# Patient Record
Sex: MALE | Race: WHITE | NOT HISPANIC OR LATINO
[De-identification: names, ages, dates, MRNs, and addresses within clinical notes are randomized per-mention and may not be internally consistent; named-entity substitution may affect disease eponyms.]

---

## 2020-02-10 PROBLEM — Z00.00 ENCOUNTER FOR PREVENTIVE HEALTH EXAMINATION: Status: ACTIVE | Noted: 2020-02-10

## 2020-02-20 ENCOUNTER — APPOINTMENT (OUTPATIENT)
Dept: HEMATOLOGY ONCOLOGY | Facility: CLINIC | Age: 60
End: 2020-02-20
Payer: COMMERCIAL

## 2020-02-20 VITALS
SYSTOLIC BLOOD PRESSURE: 118 MMHG | OXYGEN SATURATION: 97 % | BODY MASS INDEX: 33.49 KG/M2 | DIASTOLIC BLOOD PRESSURE: 80 MMHG | HEIGHT: 68 IN | HEART RATE: 56 BPM | WEIGHT: 221 LBS

## 2020-02-20 DIAGNOSIS — Z78.9 OTHER SPECIFIED HEALTH STATUS: ICD-10-CM

## 2020-02-20 DIAGNOSIS — Z80.1 FAMILY HISTORY OF MALIGNANT NEOPLASM OF TRACHEA, BRONCHUS AND LUNG: ICD-10-CM

## 2020-02-20 DIAGNOSIS — C91.10 CHRONIC LYMPHOCYTIC LEUKEMIA OF B-CELL TYPE NOT HAVING ACHIEVED REMISSION: ICD-10-CM

## 2020-02-20 DIAGNOSIS — Z86.19 PERSONAL HISTORY OF OTHER INFECTIOUS AND PARASITIC DISEASES: ICD-10-CM

## 2020-02-20 DIAGNOSIS — Z80.41 FAMILY HISTORY OF MALIGNANT NEOPLASM OF OVARY: ICD-10-CM

## 2020-02-20 PROCEDURE — 85025 COMPLETE CBC W/AUTO DIFF WBC: CPT

## 2020-02-20 PROCEDURE — 99205 OFFICE O/P NEW HI 60 MIN: CPT

## 2020-02-21 LAB
ALBUMIN SERPL ELPH-MCNC: 4.8 G/DL
ALP BLD-CCNC: 64 U/L
ALT SERPL-CCNC: 15 U/L
ANION GAP SERPL CALC-SCNC: 12 MMOL/L
AST SERPL-CCNC: 12 U/L
BILIRUB SERPL-MCNC: 0.6 MG/DL
BUN SERPL-MCNC: 21 MG/DL
CALCIUM SERPL-MCNC: 10 MG/DL
CHLORIDE SERPL-SCNC: 104 MMOL/L
CO2 SERPL-SCNC: 27 MMOL/L
CREAT SERPL-MCNC: 1.25 MG/DL
GLUCOSE SERPL-MCNC: 91 MG/DL
LDH SERPL-CCNC: 161 U/L
MAGNESIUM SERPL-MCNC: 2.4 MG/DL
PHOSPHATE SERPL-MCNC: 3.5 MG/DL
POTASSIUM SERPL-SCNC: 5 MMOL/L
PROT SERPL-MCNC: 7 G/DL
SODIUM SERPL-SCNC: 142 MMOL/L
URATE SERPL-MCNC: 7 MG/DL

## 2020-02-21 NOTE — RESULTS/DATA
[FreeTextEntry1] : CBC done 2/20/20\par \par wbc: 21.7\par hgb: 15.8\par hct: 46.1\par alc: 15.43\par anc: 4.80

## 2020-02-21 NOTE — HISTORY OF PRESENT ILLNESS
[de-identified] : Mr. 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 Pt was scheduled to have cervical spine surgery at [facility] in June 2019, when a CT of the neck showed he had b/l enlarged suspicious lymph nodes. His blood work in 6/2019 showed WBC of 27. Pt followed up with Dr. Sullivan 1/9/20, who orderd FISH/Flow studies which revealed monoclonal lambda B-cell population co-expressing CD 5 and CD23, dim CD 20 epxression, CD38 negative. Negative for Zap 70. +del 13q. \par \par Pt denies recent weight loss, fevers, night sweats, bulky LAD or extreme fatigue. Appetite is good. Cervical surgery is on hold, pt's sx have improved since last year. [FreeTextEntry1] : treatment naive [de-identified] : Flow cytometry done 1/9/20:\par A monoclonal lambda B-cell population: CD5(+), CD23(+), CD38 (-)\par Zap 70 neg\par +Del 13 q\par

## 2020-02-21 NOTE — ASSESSMENT
[FreeTextEntry1] : Mr. Castillo is a 58 yo male with recently diagnosed CLL (6/2019) who presents today for a second opinion. Pt was scheduled to have cervical spine surgery at [facility] in June 2019, when a CT of the neck showed he had b/l enlarged suspicious lymph nodes. His blood work in 6/2019 showed WBC of 27. Pt followed up with Dr. Sullivan 1/9/20, who orderd FISH/Flow studies which revealed monoclonal lambda B-cell population co-expressing CD 5 and CD23, dim CD 20 epxression, CD38 negative. Negative for Zap 70. +del 13q. \par  \par \par \par We had a long discussion with the patient and his wife  about the clinical nature of CLL. We have discussed that we would not recommend any therapy at this time given that she is asymptomatic with no anemia or concerning thrombocytopenia. We explained that while CLL is not curable, it is a chronic condition and therefore we do not treat it unless it is clinically indicated. We went over the indications for therapy including bulky LAD, splenomegaly, rapidly increasing WBC, anemia, thrombocytopenia or any constitutional complaints.  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 We have advised the patient to continue with routine maintenance and age appropriate screening including mammogram, colonoscopy, dermatology evaluation, and flu vaccine. We have explained that she should avoid all live vaccines. We have discussed the increased risk of infection associated with CLL and that the patient should be aware to alert any physicians or treating providers of this diagnosis.\par

## 2022-05-24 ENCOUNTER — APPOINTMENT (OUTPATIENT)
Dept: ORTHOPEDIC SURGERY | Facility: CLINIC | Age: 62
End: 2022-05-24
Payer: COMMERCIAL

## 2022-05-24 VITALS — HEIGHT: 68 IN | WEIGHT: 215 LBS | BODY MASS INDEX: 32.58 KG/M2

## 2022-05-24 PROCEDURE — 99213 OFFICE O/P EST LOW 20 MIN: CPT

## 2022-05-24 NOTE — ASSESSMENT
[FreeTextEntry1] :  Discussed importance of non-impact exercise and muscle stretching before and after exercise.  Continue HEP. Explained the importance of ice and rest.\par \par f/u PRN

## 2022-05-24 NOTE — REASON FOR VISIT
[FreeTextEntry2] : here today for f/u left shoulder arthroscopy RCR, SAD, debridement 5/20/21.\par feels good, no complaints

## 2022-05-24 NOTE — PHYSICAL EXAM
[Left] : left shoulder [5 ___] : forward flexion 5[unfilled]/5 [5___] : internal rotation 5[unfilled]/5 [] : no scapular winging [FreeTextEntry8] : Very Mild  [FreeTextEntry9] : IR T10 [TWNoteComboBox7] : active forward flexion 170 degrees [de-identified] : active abduction 90 degrees [TWNoteComboBox6] : internal rotation L1 [de-identified] : external rotation 60 degrees

## 2023-03-20 ENCOUNTER — APPOINTMENT (OUTPATIENT)
Dept: ORTHOPEDIC SURGERY | Facility: CLINIC | Age: 63
End: 2023-03-20
Payer: COMMERCIAL

## 2023-03-20 ENCOUNTER — FORM ENCOUNTER (OUTPATIENT)
Age: 63
End: 2023-03-20

## 2023-03-20 VITALS — WEIGHT: 215 LBS | HEIGHT: 68 IN | BODY MASS INDEX: 32.58 KG/M2

## 2023-03-20 DIAGNOSIS — M17.12 UNILATERAL PRIMARY OSTEOARTHRITIS, LEFT KNEE: ICD-10-CM

## 2023-03-20 PROCEDURE — 73562 X-RAY EXAM OF KNEE 3: CPT | Mod: LT

## 2023-03-20 PROCEDURE — 99213 OFFICE O/P EST LOW 20 MIN: CPT

## 2023-03-20 PROCEDURE — L1833: CPT | Mod: LT

## 2023-03-20 RX ORDER — HYDROCODONE BITARTRATE AND ACETAMINOPHEN 5; 325 MG/1; MG/1
5-325 TABLET ORAL
Qty: 30 | Refills: 0 | Status: ACTIVE | COMMUNITY
Start: 2023-03-20 | End: 1900-01-01

## 2023-03-20 NOTE — HISTORY OF PRESENT ILLNESS
[de-identified] : Patient is here today for the left knee.  patient was playing pickelball and had sudden stop on court and noted pop of the knee. He has noted medial knee pain and needs crutches to ambulate. He notes lack of ROM of the knee and swelling. He had prior medial knee pain which was manageable with NSAIDS

## 2023-03-20 NOTE — PHYSICAL EXAM
[Left] : left knee [de-identified] : Constitutional: The patient appears well developed, well nourished. Examination of patients ability to communicate functionally was normal.  \par \par  \par \par Neurologic: Coordination is normal. Alert and oriented to time, place and person. No evidence of mood disorder, calm affect.  \par \par  \par \par    LEFT  KNEE: Inspection of the knee is as follows: MODERATE effusion. no ecchymosis, no streaking, no erythema, no atrophy, no deformities of the quad tendon and no deformities of patellar tendon.  \par \par  \par \par Palpation of the knee is as follows: medial joint line tenderness. MCL TTP femoral insertion  no obvious defects, no palpable masses, no increased warmth and no crepitus.  \par \par  \par \par Knee Range of Motion is as follows in degrees: \par \par   \par \par Extension: 2  \par \par Flexion: 90 \par \par  \par \par Strength examination of the knee is as follows:  \par \par  \par \par \par \par  \par \par Ligament Stability and Special Test:  positive McMurrays test. ligamentously stable,UNABLE TO TEST DRAWER DUE TO GUARDING/PAIN, negative posterior draw and no varus or valgus instability. patella tracks well and able to do active straight leg raise without an extensor lag.  \par THERE IS PAIN ELICITED MEDIAL WITH VALGUS STRESS. NO SIGNIFICANT LAXITY\par \par  \par \par Neurological examination of the knee is as follows: light touch is intact throughout.  \par \par  \par \par Gait and function is as follows: non-antalgic gait.  [FreeTextEntry9] : ap/lat/skiers show minimal anterior tibial plateau spurring.

## 2023-03-20 NOTE — DISCUSSION/SUMMARY
[de-identified] : Plan is for MRI Left knee and pain medication. He will WBAT in the KI.  He will Ice/elevate PRN. Eval for frx/ACL tear.

## 2023-03-21 ENCOUNTER — APPOINTMENT (OUTPATIENT)
Dept: MRI IMAGING | Facility: CLINIC | Age: 63
End: 2023-03-21
Payer: COMMERCIAL

## 2023-03-21 PROCEDURE — 73721 MRI JNT OF LWR EXTRE W/O DYE: CPT | Mod: LT

## 2023-03-27 ENCOUNTER — APPOINTMENT (OUTPATIENT)
Dept: ORTHOPEDIC SURGERY | Facility: CLINIC | Age: 63
End: 2023-03-27
Payer: COMMERCIAL

## 2023-03-27 DIAGNOSIS — S83.412A SPRAIN OF MEDIAL COLLATERAL LIGAMENT OF LEFT KNEE, INITIAL ENCOUNTER: ICD-10-CM

## 2023-03-27 PROCEDURE — 99215 OFFICE O/P EST HI 40 MIN: CPT

## 2023-03-29 NOTE — HISTORY OF PRESENT ILLNESS
[de-identified] : following up for the left knee. Patient is here today for the left knee.  Patient states the knee has been feeling better since the last visit.  He states the brace has been very helpful. Injury to the knee was 1 week ago. his pain is medial aspect of the knee.

## 2023-03-29 NOTE — PHYSICAL EXAM
[de-identified] : Constitutional: The patient appears well developed, well nourished. Examination of patients ability to communicate functionally was normal.  \par \par  \par \par Neurologic: Coordination is normal. Alert and oriented to time, place and person. No evidence of mood disorder, calm affect.  \par \par  \par \par    LEFT  KNEE: Inspection of the knee is as follows: MODERATE effusion. no ecchymosis, no streaking, no erythema, no atrophy, no deformities of the quad tendon and no deformities of patellar tendon.  \par \par  \par \par Palpation of the knee is as follows: medial joint line tenderness. MCL TTP femoral insertion  no obvious defects, no palpable masses, no increased warmth and no crepitus.  \par \par  \par \par Knee Range of Motion is as follows in degrees: \par \par   \par \par Extension: 0  \par \par Flexion: 90 \par \par  \par \par Strength examination of the knee is as follows:  \par \par  \par \par \par \par  \par \par Ligament Stability and Special Test:  positive McMurrays test. ligamentously stable, negative posterior draw and no varus or valgus instability. patella tracks well and able to do active straight leg raise without an extensor lag.  \par THERE IS PAIN ELICITED MEDIAL WITH VALGUS STRESS. NO SIGNIFICANT LAXITY\par \par  \par \par Neurological examination of the knee is as follows: light touch is intact throughout.  \par \par  \par \par Gait and function is as follows: non-antalgic gait.

## 2023-03-29 NOTE — DISCUSSION/SUMMARY
[de-identified] : Options were discussed today including oral anti-inflammatories, Physical Therapy, Steroid Injection, Hyalgan injections or Surgery. The patient had time to ask questions of the different treatment options, including doing nothing and just observing for a finite period of time and re-evaluating in the future. Patient will WBAT in the brace. Plan is for knee scope. We will not address the MCL , just the meniscus

## 2023-03-29 NOTE — REASON FOR VISIT
[FreeTextEntry2] : MRI impressions L knee: 1. Medial meniscal tear.\par 2. Arthrosis with joint effusion.\par 3. MCL low-to-moderate grade tear at the femur.\par 4. Hamstring and gastrocnemius tendinopathy with insertional tears, soft tissue edema and bursitis.

## 2023-04-02 ENCOUNTER — FORM ENCOUNTER (OUTPATIENT)
Age: 63
End: 2023-04-02

## 2023-05-05 ENCOUNTER — APPOINTMENT (OUTPATIENT)
Dept: ORTHOPEDIC SURGERY | Facility: AMBULATORY SURGERY CENTER | Age: 63
End: 2023-05-05

## 2023-09-05 ENCOUNTER — APPOINTMENT (OUTPATIENT)
Dept: ORTHOPEDIC SURGERY | Facility: CLINIC | Age: 63
End: 2023-09-05
Payer: COMMERCIAL

## 2023-09-05 VITALS — BODY MASS INDEX: 32.58 KG/M2 | WEIGHT: 215 LBS | HEIGHT: 68 IN

## 2023-09-05 PROCEDURE — 99214 OFFICE O/P EST MOD 30 MIN: CPT

## 2023-09-05 RX ORDER — MELOXICAM 15 MG/1
15 TABLET ORAL
Qty: 30 | Refills: 0 | Status: ACTIVE | COMMUNITY
Start: 2023-09-05 | End: 1900-01-01

## 2023-09-05 NOTE — REASON FOR VISIT
[FreeTextEntry2] : PATIENT HERE FOR EVAL OF LEFT KNEE PAIN.  STARTED IN 3/2023 SAW CHIDI, WAS SCHEDULED FOR SURGERY BUT KNEE PAIN GOT BETTER SO HE CANCELLED SURGERY.  LAST 6 WEEKS KNEE PAIN IS BACK,  PAIN IS 4/10, LOCKING/CATCHING EPISODES, DIFFICULTY WITH EXTENSION.  USING ALEVE FOR PAIN.

## 2023-09-05 NOTE — DATA REVIEWED
[MRI] : MRI [Left] : left [Knee] : knee [Report was reviewed and noted in the chart] : The report was reviewed and noted in the chart [I reviewed the films/CD and agree] : I reviewed the films/CD and agree [FreeTextEntry1] : Medial meniscus tear Arthrosis with joint effusion MCL low grade tear Hamstring and Gastroc tendonitis

## 2023-09-05 NOTE — DISCUSSION/SUMMARY
[de-identified] : I spent time going in detail the problem and the associated risks/benefits of left KA surgery. detailed complications but not limited to, nerve injury, non-union, repeat fracture, DVT /PE /pe postop, instability, transfusion, infection, NVA injury, stiffness, leg length discrepancy, inability to ambulate & death. discussed implant and model shown to patient. answered all patient questions. patient understands procedure and postop directions. Patient has failed conservative treatment and PT is contraindicated at this time.  Rx given for Mobic today, use as needed for pain.  Continue HEP and stretching   f/u PRN

## 2023-09-05 NOTE — PHYSICAL EXAM
[de-identified] : Constitutional: The patient appears well developed, well nourished. Examination of patients ability to communicate functionally was normal.  \par  \par   \par  \par  Neurologic: Coordination is normal. Alert and oriented to time, place and person. No evidence of mood disorder, calm affect.  \par  \par   \par  \par     LEFT  KNEE: Inspection of the knee is as follows: MODERATE effusion. no ecchymosis, no streaking, no erythema, no atrophy, no deformities of the quad tendon and no deformities of patellar tendon.  \par  \par   \par  \par  Palpation of the knee is as follows: medial joint line tenderness. MCL TTP femoral insertion  no obvious defects, no palpable masses, no increased warmth and no crepitus.  \par  \par   \par  \par  Knee Range of Motion is as follows in degrees: \par  \par    \par  \par  Extension: 2  \par  \par  Flexion: 90 \par  \par   \par  \par  Strength examination of the knee is as follows:  \par  \par   \par  \par  \par  \par   \par  \par  Ligament Stability and Special Test:  positive McMurrays test. ligamentously stable,UNABLE TO TEST DRAWER DUE TO GUARDING/PAIN, negative posterior draw and no varus or valgus instability. patella tracks well and able to do active straight leg raise without an extensor lag.  \par  THERE IS PAIN ELICITED MEDIAL WITH VALGUS STRESS. NO SIGNIFICANT LAXITY\par  \par   \par  \par  Neurological examination of the knee is as follows: light touch is intact throughout.  \par  \par   \par  \par  Gait and function is as follows: non-antalgic gait.  [] : medial joint line tenderness [5___] : hamstring 5[unfilled]/5 [Positive] : positive Willian [Left] : left knee [All Views] : anteroposterior, lateral, skyline, and anteroposterior standing [de-identified] : PAIN VALGUS STABLE  [FreeTextEntry9] : ap/lat/skiers show minimal anterior tibial plateau spurring. [TWNoteComboBox7] : flexion 120 degrees [de-identified] : extension 0 degrees

## 2023-10-19 ENCOUNTER — APPOINTMENT (OUTPATIENT)
Dept: ORTHOPEDIC SURGERY | Facility: AMBULATORY SURGERY CENTER | Age: 63
End: 2023-10-19
Payer: COMMERCIAL

## 2023-10-19 PROCEDURE — 20610 DRAIN/INJ JOINT/BURSA W/O US: CPT | Mod: 59,RT

## 2023-10-19 PROCEDURE — 29881 ARTHRS KNE SRG MNISECTMY M/L: CPT | Mod: AS,RT

## 2023-10-19 PROCEDURE — 29881 ARTHRS KNE SRG MNISECTMY M/L: CPT | Mod: RT

## 2023-10-19 RX ORDER — HYDROCODONE BITARTRATE AND ACETAMINOPHEN 5; 325 MG/1; MG/1
5-325 TABLET ORAL
Qty: 10 | Refills: 0 | Status: ACTIVE | COMMUNITY
Start: 2023-10-19 | End: 1900-01-01

## 2023-10-27 ENCOUNTER — APPOINTMENT (OUTPATIENT)
Dept: ORTHOPEDIC SURGERY | Facility: CLINIC | Age: 63
End: 2023-10-27
Payer: COMMERCIAL

## 2023-10-27 PROCEDURE — 99024 POSTOP FOLLOW-UP VISIT: CPT

## 2023-11-21 ENCOUNTER — APPOINTMENT (OUTPATIENT)
Dept: ORTHOPEDIC SURGERY | Facility: CLINIC | Age: 63
End: 2023-11-21
Payer: COMMERCIAL

## 2023-11-21 DIAGNOSIS — Z78.9 OTHER SPECIFIED HEALTH STATUS: ICD-10-CM

## 2023-11-21 DIAGNOSIS — S83.242A OTHER TEAR OF MEDIAL MENISCUS, CURRENT INJURY, LEFT KNEE, INITIAL ENCOUNTER: ICD-10-CM

## 2023-11-21 PROCEDURE — 99213 OFFICE O/P EST LOW 20 MIN: CPT | Mod: 24

## 2024-03-12 ENCOUNTER — APPOINTMENT (OUTPATIENT)
Dept: ORTHOPEDIC SURGERY | Facility: CLINIC | Age: 64
End: 2024-03-12
Payer: COMMERCIAL

## 2024-03-12 VITALS — WEIGHT: 215 LBS | HEIGHT: 68 IN | BODY MASS INDEX: 32.58 KG/M2

## 2024-03-12 PROCEDURE — 20610 DRAIN/INJ JOINT/BURSA W/O US: CPT | Mod: LT

## 2024-03-12 PROCEDURE — 73564 X-RAY EXAM KNEE 4 OR MORE: CPT | Mod: LT

## 2024-03-12 PROCEDURE — 99214 OFFICE O/P EST MOD 30 MIN: CPT | Mod: 25

## 2024-03-12 NOTE — PHYSICAL EXAM
[Left] : left knee [NL (0)] : extension 0 degrees [5___] : hamstring 5[unfilled]/5 [Positive] : positive Willian [] : patient ambulates without assistive device [FreeTextEntry3] : grade 1 trace effusion  [de-identified] : no pain with valgus stress  [TWNoteComboBox7] : flexion 125 degrees

## 2024-03-12 NOTE — DISCUSSION/SUMMARY
[de-identified] : Lengthy discussion regarding options was had with the patient. Nonsurgical options including but not limited to cortisone, Visco supplementation, anti-inflammatory medications (both steroidal and non-steroidal), activity modification, non-impact exercise, maintaining a healthy BMI, bracing, and icing were reviewed. Surgical options including but not limited to arthroscopy, and joint replacement were discussed as was risks, benefits and alternatives.  todays plan is to procced with left knee gelsyn injection, discussed briefly about MUKA as well.   f/u 1 week 
no

## 2024-03-12 NOTE — REASON FOR VISIT
[FreeTextEntry2] : H ere for f/u left knee pain.  s/p left knee scope 10/19/23.  states pain started in 2/2024 after playing pickleball everyday.  he is very active. c/o  medial sided pain and swelling, buckling, going downstairs one at a time now.

## 2024-03-12 NOTE — PROCEDURE
[Large Joint Injection] : Large joint injection [de-identified] : Large joint injection was performed of the left knee . Injection # 1    The indication for this procedure was pain and inflammation. The site was prepped with betadine and sterile technique used. Patient tolerated procedure well. Patient was advised to call if redness, pain or fever occur, apply ice for 15 minutes out of every hour for the next 12-24 hours as tolerated and patient was advised to rest the joint(s) for 1 days.    Injection:  Glesyn Series 16.8mg/ml injection     Patient has tried OTC's including aspirin, Ibuprofen, Aleve, etc or prescription NSAIDS, and/or exercises at home and/or physical therapy without satisfactory response, patient had decreased mobility in the joint and the risks benefits, and alternatives have been discussed, and verbal consent was obtained.

## 2024-03-22 ENCOUNTER — APPOINTMENT (OUTPATIENT)
Dept: ORTHOPEDIC SURGERY | Facility: CLINIC | Age: 64
End: 2024-03-22
Payer: COMMERCIAL

## 2024-03-22 PROCEDURE — 20610 DRAIN/INJ JOINT/BURSA W/O US: CPT | Mod: LT

## 2024-03-22 NOTE — PHYSICAL EXAM
[Left] : left knee [NL (0)] : extension 0 degrees [5___] : quadriceps 5[unfilled]/5 [Positive] : positive Willian [] : patient ambulates without assistive device [FreeTextEntry3] : grade 1 trace effusion  [de-identified] : no pain with valgus stress  [TWNoteComboBox7] : flexion 125 degrees

## 2024-03-22 NOTE — PROCEDURE
[Left] : of the left [Large Joint Injection] : Large joint injection [Knee] : knee [Pain] : pain [Inflammation] : inflammation [Betadine] : betadine [Ethyl Chloride sprayed topically] : ethyl chloride sprayed topically [Sterile technique used] : sterile technique used [___ cc    1%] : Lidocaine ~Vcc of 1%  [___ cc    6mg] :  Betamethasone (Celestone) ~Vcc of 6mg [Gel-Syn (16.8mg)] : 16.8mg of Gel-Syn [#2] : series #2 [] : Patient tolerated procedure well [Call if redness, pain or fever occur] : call if redness, pain or fever occur [Apply ice for 15min out of every hour for the next 12-24 hours as tolerated] : apply ice for 15 minutes out of every hour for the next 12-24 hours as tolerated [Previous OTC use and PT nontherapeutic] : patient has tried OTC's including aspirin, Ibuprofen, Aleve, etc or prescription NSAIDS, and/or exercises at home and/or physical therapy without satisfactory response [Risks, benefits, alternatives discussed / Verbal consent obtained] : the risks benefits, and alternatives have been discussed, and verbal consent was obtained [Patient had decreased mobility in the joint] : patient had decreased mobility in the joint [de-identified] : Large joint injection was performed of the left knee . Injection # 1    The indication for this procedure was pain and inflammation. The site was prepped with betadine and sterile technique used. Patient tolerated procedure well. Patient was advised to call if redness, pain or fever occur, apply ice for 15 minutes out of every hour for the next 12-24 hours as tolerated and patient was advised to rest the joint(s) for 1 days.    Injection:  Glesyn Series 16.8mg/ml injection     Patient has tried OTC's including aspirin, Ibuprofen, Aleve, etc or prescription NSAIDS, and/or exercises at home and/or physical therapy without satisfactory response, patient had decreased mobility in the joint and the risks benefits, and alternatives have been discussed, and verbal consent was obtained.

## 2024-03-29 ENCOUNTER — APPOINTMENT (OUTPATIENT)
Dept: ORTHOPEDIC SURGERY | Facility: CLINIC | Age: 64
End: 2024-03-29
Payer: COMMERCIAL

## 2024-03-29 DIAGNOSIS — M17.12 UNILATERAL PRIMARY OSTEOARTHRITIS, LEFT KNEE: ICD-10-CM

## 2024-03-29 PROCEDURE — 20611 DRAIN/INJ JOINT/BURSA W/US: CPT | Mod: LT

## 2024-03-29 NOTE — DISCUSSION/SUMMARY
[de-identified] : He states he has not felt relief form last 2 shots. Patient tolerated today's procedure well. f/u PRN

## 2024-03-29 NOTE — PHYSICAL EXAM
[Left] : left knee [NL (0)] : extension 0 degrees [5___] : hamstring 5[unfilled]/5 [Positive] : positive Willian [] : patient ambulates without assistive device [FreeTextEntry3] : grade 1 trace effusion  [de-identified] : no pain with valgus stress  [TWNoteComboBox7] : flexion 125 degrees

## 2024-03-29 NOTE — PROCEDURE
[Large Joint Injection] : Large joint injection [Left] : of the left [Knee] : knee [Pain] : pain [Inflammation] : inflammation [Betadine] : betadine [Ethyl Chloride sprayed topically] : ethyl chloride sprayed topically [Sterile technique used] : sterile technique used [___ cc    6mg] :  Betamethasone (Celestone) ~Vcc of 6mg [___ cc    1%] : Lidocaine ~Vcc of 1%  [Gel-Syn (16.8mg)] : 16.8mg of Gel-Syn [#3] : series #3 [] : Patient tolerated procedure well [Call if redness, pain or fever occur] : call if redness, pain or fever occur [Apply ice for 15min out of every hour for the next 12-24 hours as tolerated] : apply ice for 15 minutes out of every hour for the next 12-24 hours as tolerated [Previous OTC use and PT nontherapeutic] : patient has tried OTC's including aspirin, Ibuprofen, Aleve, etc or prescription NSAIDS, and/or exercises at home and/or physical therapy without satisfactory response [Risks, benefits, alternatives discussed / Verbal consent obtained] : the risks benefits, and alternatives have been discussed, and verbal consent was obtained [Patient had decreased mobility in the joint] : patient had decreased mobility in the joint [de-identified] : Large joint injection was performed of the left knee . Injection # 1    The indication for this procedure was pain and inflammation. The site was prepped with betadine and sterile technique used. Patient tolerated procedure well. Patient was advised to call if redness, pain or fever occur, apply ice for 15 minutes out of every hour for the next 12-24 hours as tolerated and patient was advised to rest the joint(s) for 1 days.    Injection:  Glesyn Series 16.8mg/ml injection     Patient has tried OTC's including aspirin, Ibuprofen, Aleve, etc or prescription NSAIDS, and/or exercises at home and/or physical therapy without satisfactory response, patient had decreased mobility in the joint and the risks benefits, and alternatives have been discussed, and verbal consent was obtained.

## 2024-09-27 ENCOUNTER — APPOINTMENT (OUTPATIENT)
Dept: ORTHOPEDIC SURGERY | Facility: CLINIC | Age: 64
End: 2024-09-27
Payer: COMMERCIAL

## 2024-09-27 DIAGNOSIS — M17.12 UNILATERAL PRIMARY OSTEOARTHRITIS, LEFT KNEE: ICD-10-CM

## 2024-09-27 PROCEDURE — 20610 DRAIN/INJ JOINT/BURSA W/O US: CPT | Mod: LT

## 2024-09-27 PROCEDURE — 99213 OFFICE O/P EST LOW 20 MIN: CPT | Mod: 25

## 2024-09-27 NOTE — PROCEDURE
[Large Joint Injection] : Large joint injection [Left] : of the left [Knee] : knee [Pain] : pain [Inflammation] : inflammation [X-ray evidence of Osteoarthritis on this or prior visit] : x-ray evidence of Osteoarthritis on this or prior visit [Repeat series performed] : repeat series performed [Betadine] : betadine [Ethyl Chloride sprayed topically] : ethyl chloride sprayed topically [Sterile technique used] : sterile technique used [Gel-Syn (16.8mg)] : 16.8mg of Gel-Syn [#1] : series #1 [] : Patient tolerated procedure well [Call if redness, pain or fever occur] : call if redness, pain or fever occur [Patient had decreased mobility in the joint] : patient had decreased mobility in the joint [Risks, benefits, alternatives discussed / Verbal consent obtained] : the risks benefits, and alternatives have been discussed, and verbal consent was obtained

## 2024-09-27 NOTE — PHYSICAL EXAM
[Left] : left knee [5___] : hamstring 5[unfilled]/5 [] : patient ambulates without assistive device [TWNoteComboBox7] : flexion 130 degrees [de-identified] : extension 0 degrees

## 2024-09-27 NOTE — HISTORY OF PRESENT ILLNESS
[de-identified] : Patient presents in office today with LT knee pain. Would like to start gelsyn injections today.

## 2024-09-27 NOTE — DISCUSSION/SUMMARY
[de-identified] : The patient was advised of the diagnosis. The natural history of the pathology was explained in full to the patient in layman's terms. Several different treatment options were discussed and explained in full to the patient including the risks and benefits of both surgical and non-surgical treatments. All questions and concerns were answered.   Lengthy discussion regarding options was had with the patient. Nonsurgical options including but not limited to cortisone,viscosupplementation, anti-inflammatory medications, activity modification,  non impact exercise /maintaining a healthy BMI, Weight loss program  bracing, and icing were reviewed. Surgical options including but not limited to arthroscopy, and joint replacement were discussed as was risks, benefits and alternatives. All questions were answered.  At this time due to increased pain and inflammation, along with previous success with the gel injection series, plan is to move forward with gel injection series. Patient tolerated procedure well, f/u 1 week for 2nd

## 2024-10-11 ENCOUNTER — APPOINTMENT (OUTPATIENT)
Dept: ORTHOPEDIC SURGERY | Facility: CLINIC | Age: 64
End: 2024-10-11
Payer: COMMERCIAL

## 2024-10-11 DIAGNOSIS — M17.12 UNILATERAL PRIMARY OSTEOARTHRITIS, LEFT KNEE: ICD-10-CM

## 2024-10-11 PROCEDURE — 20610 DRAIN/INJ JOINT/BURSA W/O US: CPT | Mod: LT

## 2024-10-18 ENCOUNTER — APPOINTMENT (OUTPATIENT)
Dept: ORTHOPEDIC SURGERY | Facility: CLINIC | Age: 64
End: 2024-10-18
Payer: COMMERCIAL

## 2024-10-18 DIAGNOSIS — M17.12 UNILATERAL PRIMARY OSTEOARTHRITIS, LEFT KNEE: ICD-10-CM

## 2024-10-18 PROCEDURE — 20610 DRAIN/INJ JOINT/BURSA W/O US: CPT | Mod: LT

## 2025-03-25 ENCOUNTER — APPOINTMENT (OUTPATIENT)
Dept: ORTHOPEDIC SURGERY | Facility: CLINIC | Age: 65
End: 2025-03-25
Payer: COMMERCIAL

## 2025-03-25 VITALS — BODY MASS INDEX: 31.07 KG/M2 | WEIGHT: 205 LBS | HEIGHT: 68 IN

## 2025-03-25 DIAGNOSIS — M17.12 UNILATERAL PRIMARY OSTEOARTHRITIS, LEFT KNEE: ICD-10-CM

## 2025-03-25 PROCEDURE — 73564 X-RAY EXAM KNEE 4 OR MORE: CPT | Mod: LT

## 2025-04-02 ENCOUNTER — NON-APPOINTMENT (OUTPATIENT)
Age: 65
End: 2025-04-02

## 2025-04-30 ENCOUNTER — NON-APPOINTMENT (OUTPATIENT)
Age: 65
End: 2025-04-30

## 2025-05-15 ENCOUNTER — APPOINTMENT (OUTPATIENT)
Dept: ORTHOPEDIC SURGERY | Facility: AMBULATORY SURGERY CENTER | Age: 65
End: 2025-05-15
Payer: COMMERCIAL

## 2025-05-15 PROCEDURE — 20985 CPTR-ASST DIR MS PX: CPT

## 2025-05-15 PROCEDURE — 27446 REVISION OF KNEE JOINT: CPT | Mod: LT

## 2025-05-15 PROCEDURE — 27446 REVISION OF KNEE JOINT: CPT | Mod: AS,LT

## 2025-05-15 RX ORDER — FAMOTIDINE 20 MG/1
20 TABLET, FILM COATED ORAL
Qty: 60 | Refills: 0 | Status: ACTIVE | COMMUNITY
Start: 2025-05-15 | End: 1900-01-01

## 2025-05-15 RX ORDER — ASPIRIN 81 MG/1
81 TABLET, DELAYED RELEASE ORAL
Qty: 60 | Refills: 0 | Status: ACTIVE | COMMUNITY
Start: 2025-05-15 | End: 1900-01-01

## 2025-05-15 RX ORDER — ACETAMINOPHEN EXTRA STRENGTH 500 MG/1
500 TABLET ORAL EVERY 8 HOURS
Qty: 180 | Refills: 0 | Status: ACTIVE | COMMUNITY
Start: 2025-05-15 | End: 1900-01-01

## 2025-05-15 RX ORDER — CEPHALEXIN 500 MG/1
500 CAPSULE ORAL 4 TIMES DAILY
Qty: 4 | Refills: 0 | Status: ACTIVE | COMMUNITY
Start: 2025-05-15 | End: 1900-01-01

## 2025-05-15 RX ORDER — OXYCODONE 5 MG/1
5 TABLET ORAL
Qty: 42 | Refills: 0 | Status: ACTIVE | COMMUNITY
Start: 2025-05-15 | End: 1900-01-01

## 2025-05-15 RX ORDER — MELOXICAM 15 MG/1
15 TABLET ORAL DAILY
Qty: 30 | Refills: 0 | Status: ACTIVE | COMMUNITY
Start: 2025-05-15 | End: 1900-01-01

## 2025-05-22 ENCOUNTER — NON-APPOINTMENT (OUTPATIENT)
Age: 65
End: 2025-05-22

## 2025-05-30 ENCOUNTER — APPOINTMENT (OUTPATIENT)
Dept: ORTHOPEDIC SURGERY | Facility: CLINIC | Age: 65
End: 2025-05-30
Payer: COMMERCIAL

## 2025-05-30 DIAGNOSIS — Z96.652 PRESENCE OF LEFT ARTIFICIAL KNEE JOINT: ICD-10-CM

## 2025-05-30 PROCEDURE — 99024 POSTOP FOLLOW-UP VISIT: CPT

## 2025-06-19 ENCOUNTER — NON-APPOINTMENT (OUTPATIENT)
Age: 65
End: 2025-06-19

## 2025-06-26 ENCOUNTER — APPOINTMENT (OUTPATIENT)
Dept: ORTHOPEDIC SURGERY | Facility: CLINIC | Age: 65
End: 2025-06-26
Payer: COMMERCIAL

## 2025-06-26 PROCEDURE — 99024 POSTOP FOLLOW-UP VISIT: CPT

## 2025-08-13 ENCOUNTER — NON-APPOINTMENT (OUTPATIENT)
Age: 65
End: 2025-08-13

## 2025-08-19 ENCOUNTER — NON-APPOINTMENT (OUTPATIENT)
Age: 65
End: 2025-08-19

## 2025-09-02 ENCOUNTER — APPOINTMENT (OUTPATIENT)
Dept: ORTHOPEDIC SURGERY | Facility: CLINIC | Age: 65
End: 2025-09-02
Payer: COMMERCIAL

## 2025-09-02 DIAGNOSIS — Z96.652 PRESENCE OF LEFT ARTIFICIAL KNEE JOINT: ICD-10-CM

## 2025-09-02 PROCEDURE — 99213 OFFICE O/P EST LOW 20 MIN: CPT

## 2025-09-02 PROCEDURE — 73562 X-RAY EXAM OF KNEE 3: CPT | Mod: LT
